# Patient Record
Sex: MALE | Race: WHITE | NOT HISPANIC OR LATINO | Employment: UNEMPLOYED | URBAN - METROPOLITAN AREA
[De-identification: names, ages, dates, MRNs, and addresses within clinical notes are randomized per-mention and may not be internally consistent; named-entity substitution may affect disease eponyms.]

---

## 2022-08-23 ENCOUNTER — OFFICE VISIT (OUTPATIENT)
Dept: NEUROSURGERY | Facility: CLINIC | Age: 42
End: 2022-08-23

## 2022-08-23 ENCOUNTER — HOSPITAL ENCOUNTER (OUTPATIENT)
Dept: GENERAL RADIOLOGY | Facility: HOSPITAL | Age: 42
Discharge: HOME | End: 2022-08-23
Admitting: NURSE PRACTITIONER

## 2022-08-23 VITALS
WEIGHT: 200.7 LBS | BODY MASS INDEX: 26.6 KG/M2 | OXYGEN SATURATION: 100 % | SYSTOLIC BLOOD PRESSURE: 147 MMHG | HEIGHT: 73 IN | DIASTOLIC BLOOD PRESSURE: 79 MMHG | HEART RATE: 96 BPM

## 2022-08-23 DIAGNOSIS — G89.29 CHRONIC BILATERAL LOW BACK PAIN WITH BILATERAL SCIATICA: ICD-10-CM

## 2022-08-23 DIAGNOSIS — M54.42 CHRONIC BILATERAL LOW BACK PAIN WITH BILATERAL SCIATICA: ICD-10-CM

## 2022-08-23 DIAGNOSIS — M54.2 CERVICALGIA: ICD-10-CM

## 2022-08-23 DIAGNOSIS — M54.41 CHRONIC BILATERAL LOW BACK PAIN WITH BILATERAL SCIATICA: ICD-10-CM

## 2022-08-23 DIAGNOSIS — M47.816 LUMBAR FACET ARTHROPATHY: ICD-10-CM

## 2022-08-23 DIAGNOSIS — M51.36 DEGENERATIVE DISC DISEASE, LUMBAR: Primary | ICD-10-CM

## 2022-08-23 PROCEDURE — 72040 X-RAY EXAM NECK SPINE 2-3 VW: CPT

## 2022-08-23 PROCEDURE — 99204 OFFICE O/P NEW MOD 45 MIN: CPT | Performed by: NURSE PRACTITIONER

## 2022-08-23 RX ORDER — CYCLOBENZAPRINE HCL 10 MG
TABLET ORAL
COMMUNITY
Start: 2022-08-04

## 2022-08-23 RX ORDER — HYDROCODONE BITARTRATE AND ACETAMINOPHEN 5; 325 MG/1; MG/1
1 TABLET ORAL 3 TIMES DAILY PRN
COMMUNITY
Start: 2022-08-19

## 2022-08-23 NOTE — PATIENT INSTRUCTIONS
-Keep appointment with pain management  -Smoking cessation  -XR Cervical spine  -Physical therapy for neck and low back pain  -Follow up in 6 weeks

## 2022-08-23 NOTE — PROGRESS NOTES
Chief Complaint  Establish Care (New patient is here for a second opinion on MRI Lumbar Spine wo Contrast completed 04/05/2022, currently is treated at pain management. H/O of neck and mid-back pain.) and Back Pain    Subjective          Omero Gunderson . who is a 42 y.o. year old male who presents to [facility] for evaluation of lumbar spine.      The patient complains of pain located in the lumbar spine.  Patients states the pain has been present for several years.  He had been followed by pain management since 2013 after a work injury. 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 The pain came on gradually.  The pain scale level is 5.  Pain is primarily in the lower lumbar spine and tailbone. The pain does radiate. Dermatomes are located bilaterally Lumbar at: S1..  The pain is waxing/waning and described as sharp and stabbing, shocking.  The pain is worse in the morning. Patient states bending, twisting and lifting makes the pain worse.  Patient states rest, pain medication and muscle relaxants makes the pain better.    Associated Symptoms Include: Numbness and Tingling  Conservative Interventions Include: Pain Medications that were somewhat effective. and Muscle Relaxants that were somewhat effective.    Was this the result of an injury or accident?: No    History of Previous Spinal Surgery?: No    Nicotine use:  smoker  (1 ppd)    BMI: Body mass index is 26.48 kg/m².    Pt is also having concerns of neck pain today. Pain is severe. Limited range of motion and stiffness in the neck. He is having headaches due to the neck pain.    Review of Systems   Musculoskeletal: Positive for arthralgias, back pain, neck pain and neck stiffness.   Neurological: Positive for numbness and headache.   All other systems reviewed and are negative.       Objective   Vital Signs:   /79 (BP Location: Right arm, Patient Position: Sitting, Cuff Size:  "Adult)   Pulse 96   Ht 185.4 cm (73\")   Wt 91 kg (200 lb 11.2 oz)   SpO2 100%   BMI 26.48 kg/m²       Physical Exam  Vitals reviewed.   Constitutional:       Appearance: Normal appearance.   Musculoskeletal:      Right shoulder: No tenderness. Normal range of motion.      Left shoulder: No tenderness. Normal range of motion.      Cervical back: Tenderness present. Pain with movement present. Decreased range of motion.      Lumbar back: No tenderness. Negative right straight leg raise test and negative left straight leg raise test.      Right hip: No tenderness. Normal range of motion.      Left hip: No tenderness. Normal range of motion.   Neurological:      Mental Status: He is alert and oriented to person, place, and time.      Gait: Gait is intact.      Deep Tendon Reflexes: Strength normal.      Reflex Scores:       Tricep reflexes are 2+ on the right side and 2+ on the left side.       Bicep reflexes are 2+ on the right side and 2+ on the left side.       Brachioradialis reflexes are 2+ on the right side and 2+ on the left side.       Patellar reflexes are 2+ on the right side and 2+ on the left side.       Achilles reflexes are 2+ on the right side and 2+ on the left side.       Neurologic Exam     Mental Status   Oriented to person, place, and time.   Level of consciousness: alert    Motor Exam   Muscle bulk: normal  Overall muscle tone: normal    Strength   Strength 5/5 throughout.     Sensory Exam   Light touch normal.     Gait, Coordination, and Reflexes     Gait  Gait: normal    Reflexes   Right brachioradialis: 2+  Left brachioradialis: 2+  Right biceps: 2+  Left biceps: 2+  Right triceps: 2+  Left triceps: 2+  Right patellar: 2+  Left patellar: 2+  Right achilles: 2+  Left achilles: 2+  Right Banegas: absent  Left Banegas: absent  Right ankle clonus: absent  Left ankle clonus: absent       Result Review :       Data reviewed: Radiologic studies MRI Lumbar Spine on 4/5/22 at [facility] " personally reviewed. Multilevel degenerative changes. At L4/5 there is a disc bulge combined with facet arthropathy resulting in mild canal and moderate left greater than right foraminal stenosis. At L5/S1 there is a disc bulge combined with disc protrusion resulting in mild canal and moderate bilateral foraminal stenosis. Incidental hemangiomas at L2 and L3.           Assessment and Plan    Diagnoses and all orders for this visit:    1. Degenerative disc disease, lumbar (Primary)  -     Ambulatory Referral to Physical Therapy Evaluate and treat; Heat; Moist heat; Cross Fiber; Stretching, ROM, Strengthening    2. Lumbar facet arthropathy  -     Ambulatory Referral to Physical Therapy Evaluate and treat; Heat; Moist heat; Cross Fiber; Stretching, ROM, Strengthening    3. Chronic bilateral low back pain with bilateral sciatica  -     Ambulatory Referral to Physical Therapy Evaluate and treat; Heat; Moist heat; Cross Fiber; Stretching, ROM, Strengthening    4. Cervicalgia  -     Ambulatory Referral to Physical Therapy Evaluate and treat; Heat; Moist heat; Cross Fiber; Stretching, ROM, Strengthening  -     XR Spine Cervical 2 or 3 View; Future    Pt presenting for evaluation of low back pain with sciatica. We reviewed his MRI Lumbar Spine, demonstrating degenerative changes most significant at L4/5 and L5/S1. Pt's back pain is greater than leg pain.     We discussed the importance of core strengthening, avoidance of activities that worsen the pain, nicotine cessation and maintenance of healthy weight. Surgery for patients with multilevel degenerative disc disease is not typically successful with the risks outweighing the benefit.     He is scheduled for evaluation with [facility]. I do think he would benefit from epidural steroid injections and potentially facet joint injections.     He is also having concerns of neck pain with headaches. He has not had any interventions for his neck. Will proceed with  XR C Spine and refer for physical therapy.     Follow up in 6 weeks.       Follow Up   Return in about 6 weeks (around 10/4/2022).  Patient was given instructions and counseling regarding his condition or for health maintenance advice.     -Keep appointment with pain management  -Smoking cessation  -XR Cervical spine  -Physical therapy for neck and low back pain  -Follow up in 6 weeks

## 2025-06-19 ENCOUNTER — TELEMEDICINE (OUTPATIENT)
Dept: FAMILY MEDICINE CLINIC | Facility: TELEHEALTH | Age: 45
End: 2025-06-19
Payer: COMMERCIAL

## 2025-06-19 DIAGNOSIS — S20.469A TICK BITE OF BACK WALL OF THORAX, UNSPECIFIED LOCATION, INITIAL ENCOUNTER: Primary | ICD-10-CM
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
## 2025-06-19 RX ORDER — DOXYCYCLINE 100 MG/1
200 CAPSULE ORAL ONCE
Qty: 2 CAPSULE | Refills: 0 | Status: SHIPPED | OUTPATIENT
Start: 2025-06-19 | End: 2025-06-19

## 2025-06-19 RX ORDER — MUPIROCIN CALCIUM 20 MG/G
1 CREAM TOPICAL 2 TIMES DAILY
Qty: 22 G | Refills: 0 | Status: SHIPPED | OUTPATIENT
Start: 2025-06-19 | End: 2025-06-24

## 2025-06-19 RX ORDER — PREGABALIN 75 MG/1
CAPSULE ORAL
COMMUNITY

## 2025-06-19 NOTE — PROGRESS NOTES
Mode of Visit: Video  Location of patient: -HOME-  Location of provider: +HOME+  You have chosen to receive care through a telehealth visit.  The patient has signed the video visit consent form.  The visit included audio and video interaction. No technical issues occurred during this visit.    MARTINEZ  Omero Gunderson Jr. is a 45 y.o. male  presents with complaint of tick bite.  He reports that he was bitten by a tick 2 days ago.  The tick was on his back and it was removed with head intact.  He did have a headache and feel flushed but this was prior to the tick bite.  He does report a history of migraines.  He did put alcohol and peroxide on the bite after he was bitten.  They also applied an antibiotic ointment.  He reports that the tick had a spot on its back.  His Tdap has .    Review of Systems   Constitutional:  Negative for fever.   Musculoskeletal:  Positive for joint swelling (baseline) and myalgias (baseline).   Skin:         Tick bite upper back       Past Medical History:   Diagnosis Date    Asthma     Bleeding     Headache     Hypertension     Low back pain        Family History   Problem Relation Age of Onset    Hypertension Mother     Diabetes Father     Gout Father     Heart disease Father     Obesity Sister     Hypertension Sister        Social History     Socioeconomic History    Marital status:    Tobacco Use    Smoking status: Every Day     Current packs/day: 1.00     Average packs/day: 1 pack/day for 9.0 years (9.0 ttl pk-yrs)     Types: Cigarettes    Smokeless tobacco: Never   Vaping Use    Vaping status: Never Used   Substance and Sexual Activity    Alcohol use: Never    Drug use: Never    Sexual activity: Defer       Omero Gunderson Jr.  reports that he has been smoking cigarettes. He has a 9 pack-year smoking history. He has never used smokeless tobacco.  He was advised of the risk of smoking including cancer, COPD and heart disease.  He is not willing to quit at this time but may  follow-up with a primary care provider on this at a later date.     There were no vitals taken for this visit.    PHYSICAL EXAM  Physical Exam   Constitutional: He is oriented to person, place, and time. He appears well-developed.   HENT:   Head: Normocephalic and atraumatic.   Nose: Nose normal.   Eyes: Lids are normal. Right eye exhibits no discharge. Left eye exhibits no discharge. Right conjunctiva is not injected. Left conjunctiva is not injected.   Pulmonary/Chest:  No respiratory distress.  Neurological: He is alert and oriented to person, place, and time. No cranial nerve deficit.   Skin: Lesion (Crusted lesion noted posterior thoracic region) noted.   Psychiatric: He has a normal mood and affect. His speech is normal and behavior is normal. Judgment and thought content normal.       No results found for this or any previous visit.    Diagnoses and all orders for this visit:    1. Tick bite of back wall of thorax, unspecified location, initial encounter (Primary)    Other orders  -     doxycycline (MONODOX) 100 MG capsule; Take 2 capsules by mouth 1 time for 1 dose.  Dispense: 2 capsule; Refill: 0  -     mupirocin (BACTROBAN) 2 % cream; Apply 1 Application topically to the appropriate area as directed 2 (Two) Times a Day for 5 days.  Dispense: 22 g; Refill: 0    Try taking the doxycycline on an empty stomach with a full glass of water. If it bothers your stomach may take with a little food.  Do not drink milk. Do not take minerals or vitamins with minerals with this antibiotic as it decreases the therapeutic value of this antibiotic related to absorption.  Wash region with mild soap and water  Apply mupirocin ointment twice daily as directed  Monitor site for signs/symptoms of infection including red streaks increased redness  Monitor self for body aches > baseline, fever, headache, swollen lymph nodes, abdominal pain  Proceed to local pharmacy or Urgent Care for Tdap immunization    FOLLOW-UP  If symptoms  worsen or persist follow up with PCP, Urgent Care or ER depending on severity of symptoms    Patient verbalizes understanding of medication dosage, comfort measures, instructions for treatment and follow-up.    Yun Solis, DARA  06/19/2025  13:19 EDT    The use of a video visit has been reviewed with the patient and verbal informed consent has been obtained. Myself and Omero Gunderson Jr. participated in this visit. The patient is located in [address] [place] [address].    I am located in [place], [place]. [employer] and [employer] were utilized. I spent 28 minutes in the patient's chart for this visit.

## 2025-06-27 ENCOUNTER — TELEMEDICINE (OUTPATIENT)
Dept: FAMILY MEDICINE CLINIC | Facility: TELEHEALTH | Age: 45
End: 2025-06-27
Payer: COMMERCIAL

## 2025-06-27 DIAGNOSIS — K08.89 PAIN, DENTAL: Primary | ICD-10-CM

## 2025-06-27 PROCEDURE — 99213 OFFICE O/P EST LOW 20 MIN: CPT | Performed by: NURSE PRACTITIONER

## 2025-06-27 NOTE — PROGRESS NOTES
HPI  Omero Gunderson Jr. is a 45 y.o. male  presents with complaint of right upper dental pain since yesterday. He has a known broken tooth in the area. Denies feer, chills, sweats, drainage. He states he can feel a bubble with tenderness near teeth. He tried to call dentist today but they were closed.     Review of Systems    Past Medical History:   Diagnosis Date    Asthma     Bleeding     Headache     Hypertension     Low back pain        Family History   Problem Relation Age of Onset    Hypertension Mother     Diabetes Father     Gout Father     Heart disease Father     Obesity Sister     Hypertension Sister        Social History     Socioeconomic History    Marital status:    Tobacco Use    Smoking status: Every Day     Current packs/day: 1.00     Average packs/day: 1 pack/day for 9.0 years (9.0 ttl pk-yrs)     Types: Cigarettes    Smokeless tobacco: Never   Vaping Use    Vaping status: Never Used   Substance and Sexual Activity    Alcohol use: Never    Drug use: Never    Sexual activity: Defer         There were no vitals taken for this visit.    PHYSICAL EXAM  Physical Exam   Constitutional: He appears well-developed and well-nourished.   HENT:   Head: Normocephalic.   Nose: Nose normal.   Mouth/Throat:       Neck: Neck normal appearance.  Pulmonary/Chest: Effort normal.   Neurological: He is alert.   Psychiatric: He has a normal mood and affect. His speech is normal.       Diagnoses and all orders for this visit:    1. Pain, dental (Primary)  -     amoxicillin-clavulanate (AUGMENTIN) 875-125 MG per tablet; Take 1 tablet by mouth 2 (Two) Times a Day for 7 days.  Dispense: 14 tablet; Refill: 0          FOLLOW-UP  As discussed during visit with [facility], if symptoms worsen or fail to improve, follow-up with PCP/Urgent Care/Emergency Department.    Patient verbalizes understanding of medications, instructions for treatment and follow-up.    Billie Olvera, DARA  06/27/2025  17:08  EDT      Mode of Visit: Video  Location of patient: -HOME-  Location of provider: Home  You have chosen to receive care through a telehealth visit.  The patient has signed the video visit consent form.  The visit included audio and video interaction. No technical issues occurred during this visit.

## 2025-07-09 ENCOUNTER — TRANSCRIBE ORDERS (OUTPATIENT)
Dept: GENERAL RADIOLOGY | Facility: HOSPITAL | Age: 45
End: 2025-07-09
Payer: COMMERCIAL

## 2025-07-09 ENCOUNTER — HOSPITAL ENCOUNTER (OUTPATIENT)
Dept: GENERAL RADIOLOGY | Facility: HOSPITAL | Age: 45
Discharge: HOME | End: 2025-07-09
Admitting: NURSE PRACTITIONER
Payer: COMMERCIAL

## 2025-07-09 DIAGNOSIS — M54.6 PAIN IN THORACIC SPINE: ICD-10-CM

## 2025-07-09 DIAGNOSIS — M54.6 PAIN IN THORACIC SPINE: Primary | ICD-10-CM

## 2025-07-09 PROCEDURE — 72072 X-RAY EXAM THORAC SPINE 3VWS: CPT
